# Patient Record
Sex: MALE
[De-identification: names, ages, dates, MRNs, and addresses within clinical notes are randomized per-mention and may not be internally consistent; named-entity substitution may affect disease eponyms.]

---

## 2018-07-20 ENCOUNTER — HOSPITAL ENCOUNTER (EMERGENCY)
Dept: HOSPITAL 25 - UCEAST | Age: 18
Discharge: HOME | End: 2018-07-20
Payer: COMMERCIAL

## 2018-07-20 VITALS — DIASTOLIC BLOOD PRESSURE: 65 MMHG | SYSTOLIC BLOOD PRESSURE: 119 MMHG

## 2018-07-20 DIAGNOSIS — F17.200: ICD-10-CM

## 2018-07-20 DIAGNOSIS — H00.021: Primary | ICD-10-CM

## 2018-07-20 PROCEDURE — G0463 HOSPITAL OUTPT CLINIC VISIT: HCPCS

## 2018-07-20 PROCEDURE — 99211 OFF/OP EST MAY X REQ PHY/QHP: CPT

## 2018-07-20 NOTE — UC
Eye Complaint HPI





- HPI Summary


HPI Summary: 





17 yo male presents with right eye stye for the last 2 months. Initially he did 

warm compresses and had good relief, but then returned. He was seen here about 

a month ago and was given erythromycin ointment and bactrim po he continued 

with warm compresses and his stye went down and was gone. As soon as he stopped 

compresses and anbx ointment his stye returned. He is here for recheck. Denies 

pain, vision changes, headache, injury, or contact usage.








- History of Current Complaint


Chief Complaint: UCEye


Stated Complaint: EYE COMPLAINT


Time Seen by Provider: 07/20/18 12:05


Hx Obtained From: Patient


Onset/Duration: Gradual Onset


Severity Currently: None


Pain Intensity: 0





- Allergies/Home Medications


Allergies/Adverse Reactions: 


 Allergies











Allergy/AdvReac Type Severity Reaction Status Date / Time


 


No Known Allergies Allergy   Verified 07/20/18 11:36














PMH/Surg Hx/FS Hx/Imm Hx





- Additional Past Medical History


Additional PMH: 





None


Previously Healthy: Yes





- Surgical History


Surgical History: None





- Family History


Known Family History: Positive: Diabetes





- Social History


Occupation: Student


Lives: With Family


Alcohol Use: Occasionally


Substance Use Type: None


Smoking Status (MU): Current Some Day Smoker





- Immunization History


Vaccination Up to Date: Yes





Review of Systems


Constitutional: Negative


Skin: Negative


Eyes: Other - Eyelid nodule


ENT: Negative


Respiratory: Negative


Cardiovascular: Negative


Gastrointestinal: Negative


Neurological: Negative


Psychological: Negative


All Other Systems Reviewed And Are Negative: Yes





Physical Exam





- Summary


Physical Exam Summary: 





GENERAL: NAD. WDWN. No pain distress.


SKIN: No rashes, sores, lesions, or open wounds.


HEENT:


            Head: AT/NC


            Eyes: EOM intact. Conjunctiva clear without inflammation or 

discharge. RIGHT upper eyelid with central 2mm nodule and mild surround 

erythema and edema. Mildly TTP. No drainage able to be expressed.


            Ears: Hearing grossly normal. TMs intact, no bulging, erythema, or 

edema. 


            Nose: Nasal mucosa pink and moist. NTTP maxillary and frontal 

sinus. 


            Throat: Posterior oropharynx without exudates, erythema, or 

tonsillar enlargement.  Uvula midline.


NECK: Supple. Nontender. No lymphadenopathy. 


CHEST: No accessory muscle use. Breathing comfortably and in no distress.


CV:  Pulses intact. Brisk cap refill.


NEURO: Alert. 


PSYCH: Age appropriate behavior.


Triage Information Reviewed: Yes


Vital Signs: 


 Initial Vital Signs











Temp  98.7 F   07/20/18 11:32


 


Pulse  88   07/20/18 11:32


 


Resp  18   07/20/18 11:32


 


BP  119/65   07/20/18 11:32


 


Pulse Ox  98   07/20/18 11:32











Vital Signs Reviewed: Yes





Eye Complaint Course/Dx





- Course


Course Of Treatment: Stye refractory to conservative treatment and rx 

treatment. Will refer him to ophthalmology for evaluation and possible I&D





- Differential Dx/Diagnosis


Provider Diagnoses: Right eyelid stye





Discharge





- Sign-Out/Discharge


Documenting (check all that apply): Patient Departure





- Discharge Plan


Condition: Stable


Disposition: HOME


Patient Education Materials:  Stye (ED)


Referrals: 


No Primary Care Phys,NOPCP [Primary Care Provider] - 


Surendra Olmos MD [Medical Doctor] - As Soon As Possible


Additional Instructions: 


If you develop a fever, shortness of breath, chest pain, new or worsening 

symptoms - please call your PCP or go to the ED.


 


1) Please continue warm compresses


2) Schedule a follow up appointment with Dr. Olmos at the number below





- Billing Disposition and Condition


Condition: STABLE


Disposition: Home